# Patient Record
Sex: FEMALE | Race: BLACK OR AFRICAN AMERICAN | NOT HISPANIC OR LATINO | URBAN - NONMETROPOLITAN AREA
[De-identification: names, ages, dates, MRNs, and addresses within clinical notes are randomized per-mention and may not be internally consistent; named-entity substitution may affect disease eponyms.]

---

## 2019-05-10 ENCOUNTER — IMPORTED ENCOUNTER (OUTPATIENT)
Dept: URBAN - NONMETROPOLITAN AREA CLINIC 1 | Facility: CLINIC | Age: 59
End: 2019-05-10

## 2019-05-10 PROCEDURE — 92004 COMPRE OPH EXAM NEW PT 1/>: CPT

## 2019-05-10 PROCEDURE — 92015 DETERMINE REFRACTIVE STATE: CPT

## 2020-05-29 ENCOUNTER — IMPORTED ENCOUNTER (OUTPATIENT)
Dept: URBAN - NONMETROPOLITAN AREA CLINIC 1 | Facility: CLINIC | Age: 60
End: 2020-05-29

## 2020-05-29 PROCEDURE — 92015 DETERMINE REFRACTIVE STATE: CPT

## 2020-05-29 PROCEDURE — 92014 COMPRE OPH EXAM EST PT 1/>: CPT

## 2021-06-23 ENCOUNTER — IMPORTED ENCOUNTER (OUTPATIENT)
Dept: URBAN - NONMETROPOLITAN AREA CLINIC 1 | Facility: CLINIC | Age: 61
End: 2021-06-23

## 2021-06-23 PROCEDURE — 92014 COMPRE OPH EXAM EST PT 1/>: CPT

## 2021-06-23 PROCEDURE — 92015 DETERMINE REFRACTIVE STATE: CPT

## 2022-04-10 ASSESSMENT — VISUAL ACUITY
OU_SC: 20/30-2
OS_CC: J1
OD_SC: 20/30-1
OD_SC: 20/20-1
OD_CC: J1
OS_SC: 20/40-1
OU_CC: J1+
OS_SC: 20/20-2
OD_SC: 20/50-1
OS_SC: 20/25

## 2022-04-10 ASSESSMENT — TONOMETRY
OS_IOP_MMHG: 13
OD_IOP_MMHG: 12
OD_IOP_MMHG: 13
OS_IOP_MMHG: 14
OD_IOP_MMHG: 14
OS_IOP_MMHG: 12

## 2022-08-31 ENCOUNTER — ESTABLISHED PATIENT (OUTPATIENT)
Dept: RURAL CLINIC 1 | Facility: CLINIC | Age: 62
End: 2022-08-31

## 2022-08-31 DIAGNOSIS — H52.13: ICD-10-CM

## 2022-08-31 PROCEDURE — 92014 COMPRE OPH EXAM EST PT 1/>: CPT

## 2022-08-31 PROCEDURE — 92015 DETERMINE REFRACTIVE STATE: CPT

## 2022-08-31 ASSESSMENT — VISUAL ACUITY
OD_CC: 20/25-1
OU_CC: 20/25-1
OD_CC: 20/25-1
OS_CC: 20/25-1
OS_CC: 20/25-1
OU_CC: 20/25-1

## 2022-08-31 ASSESSMENT — TONOMETRY
OD_IOP_MMHG: 14
OS_IOP_MMHG: 14

## 2023-10-24 ENCOUNTER — EMERGENCY VISIT (OUTPATIENT)
Dept: RURAL CLINIC 1 | Facility: CLINIC | Age: 63
End: 2023-10-24

## 2023-10-24 DIAGNOSIS — B02.39: ICD-10-CM

## 2023-10-24 PROCEDURE — 99213 OFFICE O/P EST LOW 20 MIN: CPT

## 2023-10-24 ASSESSMENT — VISUAL ACUITY
OS_CC: 20/60
OS_CC: 20/40

## 2023-11-15 ENCOUNTER — COMPREHENSIVE EXAM (OUTPATIENT)
Dept: RURAL CLINIC 1 | Facility: CLINIC | Age: 63
End: 2023-11-15

## 2023-11-15 DIAGNOSIS — H52.13: ICD-10-CM

## 2023-11-15 DIAGNOSIS — H25.9: ICD-10-CM

## 2023-11-15 PROCEDURE — 92014 COMPRE OPH EXAM EST PT 1/>: CPT

## 2023-11-15 ASSESSMENT — TONOMETRY
OD_IOP_MMHG: 13
OS_IOP_MMHG: 13

## 2023-11-15 ASSESSMENT — VISUAL ACUITY
OS_CC: 20/40
OU_CC: 20/20
OD_CC: 20/30

## 2024-03-26 NOTE — PATIENT DISCUSSION
Myopia-Discussed diagnosis with patient. -Explained that people who are myopic are at a higher risk for developing RD/RT and reviewed associated S&S.-Pt to contact our office if symptoms develop. Updated spec Rx given. No

## 2025-03-06 ENCOUNTER — COMPREHENSIVE EXAM (OUTPATIENT)
Age: 65
End: 2025-03-06

## 2025-03-06 DIAGNOSIS — H25.9: ICD-10-CM

## 2025-03-06 PROCEDURE — 92014 COMPRE OPH EXAM EST PT 1/>: CPT

## 2025-06-18 NOTE — PATIENT DISCUSSION
LM to reschedule appointment on 7/23/2025   Recommend seeing a neuro-ophthalmologist, Patient shared that she has seen a neuro-ophthalmologist who diagnosed her with ophthalmic migraines. Patient stated that she does not have ophthalmic migraines.